# Patient Record
Sex: MALE | Race: OTHER
[De-identification: names, ages, dates, MRNs, and addresses within clinical notes are randomized per-mention and may not be internally consistent; named-entity substitution may affect disease eponyms.]

---

## 2021-03-20 ENCOUNTER — HOSPITAL ENCOUNTER (EMERGENCY)
Dept: HOSPITAL 7 - FB.ED | Age: 31
Discharge: HOME | End: 2021-03-20
Payer: MEDICAID

## 2021-03-20 DIAGNOSIS — Z72.0: ICD-10-CM

## 2021-03-20 DIAGNOSIS — S62.364A: Primary | ICD-10-CM
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
## 2021-03-20 NOTE — EDM.PDOC
ED HPI GENERAL MEDICAL PROBLEM





- General


Chief Complaint: Upper Extremity Injury/Pain


Stated Complaint: BROKE HAND


Time Seen by Provider: 03/20/21 10:50


Source of Information: Reports: Patient


History Limitations: Reports: No Limitations





- History of Present Illness


INITIAL COMMENTS - FREE TEXT/NARRATIVE: 





31-year-old male who reports that he punched a window at approximately midnight 

last night. He did break the window and has multiple small cuts to the dorsum of

his right hand or there is also swelling in this area. He reports the pain as a 

9/10 now and it goes up to a 10/10 when he moves it. It is a sharp pain with 

throbbing component. The pain does not radiate. There were no other injuries. 

There are no other associated signs or symptoms. There are no other modifying 

factors.


Onset: Other (Around midnight last night)


Duration: Constant


Location: Reports: Upper Extremity, Right


Quality: Reports: Sharp, Throbbing


Severity: Severe


Improves with: Reports: Immobilization, Rest


Worsens with: Reports: Movement


Context: Reports: Trauma (Punched a window as above.)


Associated Symptoms: Reports: No Other Symptoms


Treatments PTA: Reports: Other (see below) (Nothing.)


  ** Right Hand


Pain Score (Numeric/FACES): 8





- Related Data


                                    Allergies











Allergy/AdvReac Type Severity Reaction Status Date / Time


 


No Known Allergies Allergy   Verified 03/20/21 10:48














Past Medical History


Neurological History: Reports: Brain Injury


Psychiatric History: Reports: Anxiety, Depression





- Past Surgical History


HEENT Surgical History: Reports: Other (See Below) (Facial surgery status post 

trauma.)





Social & Family History





- Tobacco Use


Tobacco Use Status *Q: Current Every Day Tobacco User





- Alcohol Use


Alcohol Use History: Yes





- Living Situation & Occupation


Living situation: Reports: Single


Occupation: Unemployed





Review of Systems





- Review of Systems


Review Of Systems: See Below (Last tetanus immunization was less than 5 years 

ago according to the patient.)


Constitutional: Reports: No Symptoms


Eyes: Reports: No Symptoms


Ears: Reports: No Symptoms


Nose: Reports: No Symptoms


Mouth/Throat: Reports: No Symptoms


Respiratory: Reports: No Symptoms


Cardiovascular: Reports: No Symptoms


GI/Abdominal: Reports: No Symptoms


Genitourinary: Reports: No Symptoms


Musculoskeletal: Reports: Hand Pain (Right hand pain), Other 

(Right-hand-dominant.)


Skin: Reports: Wound (Multiple cuts to the dorsum of his right hand.), Other 

(Swelling)


Neurological: Reports: No Symptoms


Psychiatric: Reports: No Symptoms





ED EXAM, GENERAL





- Physical Exam


Exam: See Below


Exam Limited By: No Limitations


General Appearance: Alert, WD/WN, Moderate Distress (Appears in some pain.)


Eye Exam: Bilateral Eye: EOMI, Normal Inspection


Ears: Normal External Exam, Hearing Grossly Normal


Ear Exam: Bilateral Ear: Auricle Normal


Nose: Normal Inspection, Normal Mucosa, No Blood


Throat/Mouth: Normal Inspection, Normal Oropharynx, Normal Voice, No Airway 

Compromise


Head: Atraumatic, Normocephalic


Neck: Normal Inspection, Supple, Non-Tender, Full Range of Motion


Respiratory/Chest: No Respiratory Distress, Lungs Clear, Normal Breath Sounds, 

No Accessory Muscle Use, Chest Non-Tender


Cardiovascular: Normal Peripheral Pulses, Regular Rate, Rhythm, No Murmur


Peripheral Pulses: 2+: Radial (L), Radial (R)


GI/Abdominal: Normal Bowel Sounds, Soft, Non-Tender, No Mass


Back Exam: Normal Inspection, Full Range of Motion


Extremities: No Pedal Edema, Normal Capillary Refill, Limited Range of Motion 

(Of right hand secondary to pain. Swelling over the dorsum of his right hand at 

the MCP area.)


Neurological: Alert, Oriented, CN II-XII Intact, Normal Cognition, No 

Motor/Sensory Deficits


Psychiatric: Normal Affect


Skin Exam: Warm, Dry, Ecchymosis, Erythema, Wound/Incision (Past of the dorsum 

of the right hand)





ED TRAUMA EXTREMITY PROCEDURES





- Splinting


  ** Right Upper Extremity


Splint Site: Right hand, wrist and forearm


Pre-Procedure NV Status: Normal


Post-Procedure NV Status: Normal


Splint Material: Fiberglass


Splint Design: Volar


Applied & Form Fitted By: Provider


Provider Post-Splint Application NV Check: NV Status Normal, Other (No change in

position. No reduction performed.)


Complications: No


Progress/Comments: 





Wounds were dressed with bacitracin and Telfa prior to applying the splint.





Course





- Vital Signs


Last Recorded V/S: 


                                Last Vital Signs











Temp  36.6 C   03/20/21 10:34


 


Pulse  95   03/20/21 10:34


 


Resp  20   03/20/21 10:34


 


BP  130/82   03/20/21 10:34


 


Pulse Ox  92 L  03/20/21 10:34














- Orders/Labs/Meds


Orders: 


                               Active Orders 24 hr











 Category Date Time Status


 


 Hand Comp Min 3V Rt [CR] Stat Exams  03/20/21 11:02 Taken











Meds: 


Medications














Discontinued Medications














Generic Name Dose Route Start Last Admin





  Trade Name Brii  PRN Reason Stop Dose Admin


 


Bacitracin  1 dose  03/20/21 11:40 





  Bacitracin Oint 1 Gm U/D Packet  TOP  03/20/21 11:41 





  ONETIME ONE  


 


Ibuprofen  800 mg  03/20/21 11:22  03/20/21 11:30





  Ibuprofen 800 Mg Tab  PO  03/20/21 11:23  800 mg





  ONETIME ONE   Administration














- Radiology Interpretation


Free Text/Narrative:: 





X-ray of right hand shows impacted fracture with of the distal fourth 

metacarpal.





- Re-Assessments/Exams


Free Text/Narrative Re-Assessment/Exam: 





03/20/21 12:00: The wounds were examined. They were superficial/subcutaneous. I 

did not see any definite foreign body on my external exam. The x-ray did not 

show any radiopaque foreign bodies. The right hand was cleaned and the wounds 

were cleaned by the nursing staff and the wounds were dressed with bacitracin 

and Telfa and then the short arm volar splint to the right upper extremity was 

applied by myself. The patient is to follow-up with either the [facility] or 

[facility] clinic in [place] or the walk-in clinic associated with [facility] by the middle of next week. Precautions and reasons for return to the

 emergency department were discussed with the patient while he was in the 

emergency department and were detailed in his discharge instructions.





Departure





- Departure


Time of Disposition: 12:10


Disposition: Home, Self-Care 01


Condition: Good


Clinical Impression: 


 Contusion of right hand, initial encounter





Hand laceration


Qualifiers:


 Encounter type: initial encounter Foreign body presence: without foreign body 

Laterality: right Qualified Code(s): S61.411A - Laceration without foreign body 

of right hand, initial encounter





Fracture, metacarpal, neck


Qualifiers:


 Encounter type: initial encounter Metacarpal bone: fourth Fracture type: closed

 Fracture alignment: nondisplaced Laterality: right Qualified Code(s): S62.364A 

- Nondisplaced fracture of neck of fourth metacarpal bone, right hand, initial 

encounter for closed fracture








- Discharge Information


Instructions:  Contusion, Easy-to-Read, Metacarpal Fracture, Easy-to-Read, Cast 

or Splint Care, Adult, Easy-to-Read


Referrals: 


PCP,Not In Area [Primary Care Provider] - 


Forms:  ED Department Discharge, ED Return to Work/School Form


Additional Instructions: 


You have a fracture of the fourth knuckle bone or metacarpal. It is angulated 

but nondisplaced. It does not appear to be anything that would require surgery 

at this time. You also have some cuts on the top of your right hand. These were 

cleaned and dressings were applied. They and your hand will need to be rechecked

by the middle of next week. You should leave the splint intact and all times. 

You will need to follow-up either with the [facility] or [facility] clinics in 

[place] or with the Walk-in Clinic here at [facility]. No use of 

the right hand until cleared by one of these clinics or any doctor that you 

follow-up with. You can take ibuprofen and Tylenol as needed for pain. Back to 

the emergency department for marked increase in pain, fever, spreading redness 

or any other concerning sign or symptom.





Sepsis Event Note (ED)





- Evaluation


Sepsis Screening Result: No Definite Risk





- Focused Exam


Vital Signs: 


                                   Vital Signs











  Temp Pulse Resp BP Pulse Ox


 


 03/20/21 10:34  36.6 C  95  20  130/82  92 L














- My Orders


Last 24 Hours: 


My Active Orders





03/20/21 11:02


Hand Comp Min 3V Rt [CR] Stat 














- Assessment/Plan


Last 24 Hours: 


My Active Orders





03/20/21 11:02


Hand Comp Min 3V Rt [CR] Stat

## 2021-03-22 NOTE — CR
INDICATION:  Injury with swelling and pain after punching a window.  



RIGHT HAND:  Three views of the right hand were obtained 03/20/21 and revealed a
boxer's type fracture of the distal fourth metacarpal at the metaphysis with 
mild medial and possibly dorsal angulation at the fracture site - adequate 
position and alignment is suggested.  



No other bone or joint abnormality was identified.  



Soft tissue swelling is noted overlying the mid to distal metacarpals dorsally, 

MTDD